# Patient Record
Sex: MALE | Race: OTHER | Employment: FULL TIME | URBAN - NONMETROPOLITAN AREA
[De-identification: names, ages, dates, MRNs, and addresses within clinical notes are randomized per-mention and may not be internally consistent; named-entity substitution may affect disease eponyms.]

---
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
## 2018-12-18 ENCOUNTER — HOSPITAL ENCOUNTER (EMERGENCY)
Age: 18
Discharge: HOME | End: 2018-12-18
Payer: MEDICARE

## 2018-12-18 VITALS
HEIGHT: 68 IN | RESPIRATION RATE: 16 BRPM | DIASTOLIC BLOOD PRESSURE: 81 MMHG | SYSTOLIC BLOOD PRESSURE: 133 MMHG | BODY MASS INDEX: 30.31 KG/M2 | OXYGEN SATURATION: 99 % | WEIGHT: 200 LBS | HEART RATE: 89 BPM | TEMPERATURE: 98.3 F

## 2018-12-18 DIAGNOSIS — S61.411A LACERATION OF RIGHT HAND WITHOUT FOREIGN BODY, INITIAL ENCOUNTER: Primary | ICD-10-CM

## 2018-12-18 PROCEDURE — 99282 EMERGENCY DEPT VISIT SF MDM: CPT

## 2018-12-18 PROCEDURE — 90471 IMMUNIZATION ADMIN: CPT | Performed by: STUDENT IN AN ORGANIZED HEALTH CARE EDUCATION/TRAINING PROGRAM

## 2018-12-18 PROCEDURE — 90715 TDAP VACCINE 7 YRS/> IM: CPT | Performed by: STUDENT IN AN ORGANIZED HEALTH CARE EDUCATION/TRAINING PROGRAM

## 2018-12-18 PROCEDURE — 6370000000 HC RX 637 (ALT 250 FOR IP)

## 2018-12-18 PROCEDURE — 6360000002 HC RX W HCPCS: Performed by: STUDENT IN AN ORGANIZED HEALTH CARE EDUCATION/TRAINING PROGRAM

## 2018-12-18 PROCEDURE — 12002 RPR S/N/AX/GEN/TRNK2.6-7.5CM: CPT

## 2018-12-18 RX ORDER — BACITRACIN, NEOMYCIN, POLYMYXIN B 400; 3.5; 5 [USP'U]/G; MG/G; [USP'U]/G
OINTMENT TOPICAL
Status: COMPLETED
Start: 2018-12-18 | End: 2018-12-18

## 2018-12-18 RX ORDER — CEPHALEXIN 500 MG/1
500 CAPSULE ORAL 4 TIMES DAILY
Qty: 28 CAPSULE | Refills: 0 | Status: SHIPPED | OUTPATIENT
Start: 2018-12-18 | End: 2018-12-25

## 2018-12-18 RX ADMIN — BACITRACIN, NEOMYCIN, POLYMYXIN B 1 G: 400; 3.5; 5 OINTMENT TOPICAL at 14:16

## 2018-12-18 RX ADMIN — TETANUS TOXOID, REDUCED DIPHTHERIA TOXOID AND ACELLULAR PERTUSSIS VACCINE, ADSORBED 0.5 ML: 5; 2.5; 8; 8; 2.5 SUSPENSION INTRAMUSCULAR at 14:16

## 2018-12-18 ASSESSMENT — ENCOUNTER SYMPTOMS
ABDOMINAL PAIN: 0
EYE DISCHARGE: 0
VOMITING: 0
EYE REDNESS: 0
SORE THROAT: 0
COUGH: 0
WHEEZING: 0
RHINORRHEA: 0
SHORTNESS OF BREATH: 0
BACK PAIN: 0
NAUSEA: 0
DIARRHEA: 0

## 2018-12-18 ASSESSMENT — PAIN DESCRIPTION - LOCATION: LOCATION: HAND

## 2018-12-18 ASSESSMENT — PAIN DESCRIPTION - DESCRIPTORS: DESCRIPTORS: BURNING

## 2018-12-18 ASSESSMENT — PAIN SCALES - GENERAL: PAINLEVEL_OUTOF10: 5

## 2018-12-18 ASSESSMENT — PAIN DESCRIPTION - PAIN TYPE: TYPE: ACUTE PAIN

## 2018-12-18 NOTE — ED PROVIDER NOTES
Asthma. SURGICAL HISTORY      has no past surgical history on file. CURRENT MEDICATIONS       Previous Medications    ACETAMINOPHEN (APAP EXTRA STRENGTH) 500 MG TABLET    Take 1 tablet by mouth every 6 hours as needed for Pain    AZELASTINE (ASTELIN) 0.1 % NASAL SPRAY    1 spray by Nasal route 2 times daily for 14 days Use in each nostril as directed    IBUPROFEN (ADVIL;MOTRIN) 600 MG TABLET    Take 1 tablet by mouth every 6 hours as needed for Pain    IBUPROFEN (IBU) 600 MG TABLET    Take 1 tablet by mouth every 8 hours as needed for Pain or Fever. ONDANSETRON (ZOFRAN ODT) 4 MG DISINTEGRATING TABLET    Take 1 tablet by mouth every 8 hours as needed for Nausea       ALLERGIES     is allergic to other. FAMILY HISTORY     indicated that his mother is alive. He indicated that his father is alive. He indicated that the status of his sister is unknown.    family history includes Diabetes in his father and sister; High Blood Pressure in his father; No Known Problems in his mother. SOCIAL HISTORY      reports that he has never smoked. He does not have any smokeless tobacco history on file. He reports that he uses drugs, including Marijuana. He reports that he does not drink alcohol. PHYSICAL EXAM   INITIAL VITALS:  height is 5' 8\" (1.727 m) and weight is 200 lb (90.7 kg). His oral temperature is 98.3 °F (36.8 °C). His blood pressure is 133/81 and his pulse is 89. His respiration is 16 and oxygen saturation is 99%. Physical Exam   Constitutional: He is oriented to person, place, and time. He appears well-developed and well-nourished. HENT:   Head: Normocephalic and atraumatic. Right Ear: External ear normal.   Left Ear: External ear normal.   Eyes: Conjunctivae are normal. Right eye exhibits no discharge. Left eye exhibits no discharge. No scleral icterus. Neck: Normal range of motion. Neck supple. No JVD present. Pulmonary/Chest: Effort normal. No stridor. No respiratory distress.    Abdominal: Soft. He exhibits no distension. Musculoskeletal: Normal range of motion. He exhibits no edema. Right wrist: Normal. He exhibits normal range of motion. Right hand: He exhibits laceration (3 cm laceration that is 4 mm deep into the subcutaneous to the right tyler surface over the base of the thumb at the bony prawns of the scaphoid bone with no scaphoid involvement). He exhibits normal range of motion, no tenderness, normal two-point discrimination, normal capillary refill, no deformity and no swelling. Normal sensation noted. Normal strength noted. Neurological: He is alert and oriented to person, place, and time. He exhibits normal muscle tone. GCS eye subscore is 4. GCS verbal subscore is 5. GCS motor subscore is 6. Skin: Skin is warm and dry. He is not diaphoretic. No erythema. Psychiatric: He has a normal mood and affect. His behavior is normal.   Nursing note and vitals reviewed. DIFFERENTIAL DIAGNOSIS:   Including but not limited to: laceration, infection, foreign body     DIAGNOSTIC RESULTS     EKG: All EKG's are interpreted by the Emergency Department Physician who either signs or Co-signs this chart in the absence of a cardiologist.    None    RADIOLOGY: non-plainfilm images(s) such as CT, Ultrasound and MRI are read by the radiologist.    No orders to display       LABS:     Labs Reviewed - No data to display    EMERGENCY DEPARTMENT COURSE:   Vitals:    Vitals:    12/18/18 1332   BP: 133/81   Pulse: 89   Resp: 16   Temp: 98.3 °F (36.8 °C)   TempSrc: Oral   SpO2: 99%   Weight: 200 lb (90.7 kg)   Height: 5' 8\" (1.727 m)       Patient was seen history physical exam was performed. See disposition below    MDM:  The patient was seen and evaluated by me at bedside for laceration to right hand. The patient arrived in no acute distress and in stable condition. Within the department, I observed the patient's vital signs to be within acceptable range.  On exam, I appreciated 3 cm

## 2018-12-18 NOTE — ED NOTES
Pt states he was at home getting ready for work and messing around with his car and went to cut something and missed and sliced his right hand.       Tigist Rudolph RN  12/18/18 8771

## 2020-06-17 ENCOUNTER — HOSPITAL ENCOUNTER (EMERGENCY)
Age: 20
Discharge: HOME | End: 2020-06-17
Attending: EMERGENCY MEDICINE
Payer: MEDICARE

## 2020-06-17 VITALS
WEIGHT: 220 LBS | HEART RATE: 57 BPM | RESPIRATION RATE: 16 BRPM | BODY MASS INDEX: 33.45 KG/M2 | TEMPERATURE: 97 F | OXYGEN SATURATION: 97 % | DIASTOLIC BLOOD PRESSURE: 70 MMHG | SYSTOLIC BLOOD PRESSURE: 130 MMHG

## 2020-06-17 PROCEDURE — 99212 OFFICE O/P EST SF 10 MIN: CPT

## 2020-06-17 PROCEDURE — 6370000000 HC RX 637 (ALT 250 FOR IP): Performed by: EMERGENCY MEDICINE

## 2020-06-17 PROCEDURE — 99203 OFFICE O/P NEW LOW 30 MIN: CPT | Performed by: EMERGENCY MEDICINE

## 2020-06-17 RX ORDER — ONDANSETRON 4 MG/1
4 TABLET, ORALLY DISINTEGRATING ORAL 4 TIMES DAILY PRN
Qty: 10 TABLET | Refills: 0 | Status: SHIPPED | OUTPATIENT
Start: 2020-06-17 | End: 2020-12-04

## 2020-06-17 RX ORDER — ONDANSETRON 4 MG/1
8 TABLET, ORALLY DISINTEGRATING ORAL ONCE
Status: COMPLETED | OUTPATIENT
Start: 2020-06-17 | End: 2020-06-17

## 2020-06-17 RX ADMIN — ONDANSETRON 8 MG: 4 TABLET, ORALLY DISINTEGRATING ORAL at 14:23

## 2020-06-17 ASSESSMENT — ENCOUNTER SYMPTOMS
VOMITING: 1
EYE REDNESS: 0
EYE DISCHARGE: 0
DIARRHEA: 0
BACK PAIN: 0
SHORTNESS OF BREATH: 0
SINUS PRESSURE: 0
ABDOMINAL PAIN: 1
NAUSEA: 1
TROUBLE SWALLOWING: 0
STRIDOR: 0
SORE THROAT: 0
EYE PAIN: 0
WHEEZING: 0
VOICE CHANGE: 0
COUGH: 0

## 2020-06-17 NOTE — ED PROVIDER NOTES
[address]       Chief Complaint   Patient presents with    Emesis     Pt has been vomiting this morning. Nurses Notes reviewed and I agree except as noted in the HPI. HISTORY OF PRESENT ILLNESS   Georgiana Koch is a 22 y.o. male who presents with 6 hours of vomiting nonbloody, associated cramping abdominal pain. Vomiting and pain has resolved with the patient now nauseated and has fatigue. Thought he ate some tainted food prior to the onset of symptoms. No fever, diarrhea, bloody stools, chest pain, shortness of breath, dizziness, syncope, rash,  symptoms. No history of diabetes. Non-smoker. REVIEW OF SYSTEMS     Review of Systems   Constitutional: Positive for appetite change and fatigue. Negative for chills, fever and unexpected weight change. HENT: Negative for congestion, ear discharge, ear pain, sinus pressure, sneezing, sore throat, trouble swallowing and voice change. Eyes: Negative for pain, discharge and redness. Respiratory: Negative for cough, shortness of breath, wheezing and stridor. Cardiovascular: Negative for chest pain and leg swelling. Gastrointestinal: Positive for abdominal pain, nausea and vomiting. Negative for diarrhea. Genitourinary: Negative for dysuria, frequency, hematuria and urgency. Musculoskeletal: Negative for arthralgias, back pain, myalgias and neck pain. Skin: Negative for rash. Neurological: Negative for dizziness, syncope, weakness and headaches. Hematological: Negative for adenopathy. Psychiatric/Behavioral: Negative for behavioral problems, confusion, sleep disturbance and suicidal ideas. The patient is not nervous/anxious. All other systems reviewed and are negative. PAST MEDICAL HISTORY         Diagnosis Date    Asthma        SURGICAL HISTORY     Patient  has no past surgical history on file.     CURRENT MEDICATIONS       Discharge Medication List as of 6/17/2020  2:55 PM      CONTINUE these medications which have NOT CHANGED    Details   acetaminophen (APAP EXTRA STRENGTH) 500 MG tablet Take 1 tablet by mouth every 6 hours as needed for Pain, Disp-120 tablet, R-0Print      !! ibuprofen (ADVIL;MOTRIN) 600 MG tablet Take 1 tablet by mouth every 6 hours as needed for Pain, Disp-30 tablet, R-0Print      azelastine (ASTELIN) 0.1 % nasal spray 1 spray by Nasal route 2 times daily for 14 days Use in each nostril as directed, Disp-1 Bottle, R-0      !! ibuprofen (IBU) 600 MG tablet Take 1 tablet by mouth every 8 hours as needed for Pain or Fever., Disp-42 tablet, R-0       !! - Potential duplicate medications found. Please discuss with provider. ALLERGIES     Patient is is allergic to other. FAMILY HISTORY     Patient'sfamily history includes Diabetes in his father and sister; High Blood Pressure in his father; No Known Problems in his mother. SOCIAL HISTORY     Patient  reports that he has never smoked. He has never used smokeless tobacco. He reports current drug use. Drug: Marijuana. He reports that he does not drink alcohol. PHYSICAL EXAM     ED TRIAGE VITALS  BP: 130/70, Temp: 97 °F (36.1 °C), Pulse: 57, Resp: 16, SpO2: 97 %  Physical Exam  Vitals signs and nursing note reviewed. Constitutional:       General: He is not in acute distress. Appearance: He is well-developed. He is not diaphoretic. Comments: Moist membranes, normal airway, nonicteric   HENT:      Head: Normocephalic and atraumatic. Right Ear: Tympanic membrane and external ear normal.      Left Ear: Tympanic membrane and external ear normal.      Nose: Nose normal.      Mouth/Throat:      Pharynx: No oropharyngeal exudate. Comments: Oropharynx normal  Eyes:      General: No scleral icterus. Right eye: No discharge. Left eye: No discharge. Extraocular Movements:      Right eye: Normal extraocular motion. Left eye: Normal extraocular motion. Judgment normal.         DIAGNOSTIC RESULTS   Labs: No results found for this visit on 06/17/20. IMAGING:  No orders to display     URGENT CARE COURSE:     Vitals:    06/17/20 1409   BP: 130/70   Pulse: 57   Resp: 16   Temp: 97 °F (36.1 °C)   TempSrc: Temporal   SpO2: 97%   Weight: 220 lb (99.8 kg)       Medications   ondansetron (ZOFRAN-ODT) disintegrating tablet 8 mg (8 mg Oral Given 6/17/20 1423)   Tolerated well, improved               PROCEDURES:  None  FINALIMPRESSION      1. Acute infective gastroenteritis        DISPOSITION/PLAN   DISPOSITION Decision To Discharge 06/17/2020 02:37:54 PM  Nontoxic, well-hydrated, normal airway. No sepsis or CNS infection. Abdomen nonsurgical.  Patient improved. Should do well with oral rehydration and antiemetics. Will treat with Zofran, Tylenol, clear liquid diet, rest.  Patient to recheck with PCP in 2 days if problems persist, and he understands to go to ED if worse.   PATIENT REFERRED TO:  CHRISTOPHER Price - CNP  [facility] [facility] [place] [address]  [phone]    Schedule an appointment as soon as possible for a visit in 2 days  Recheck if problems persist, go to emergency if worse    DISCHARGE MEDICATIONS:  Discharge Medication List as of 6/17/2020  2:55 PM        Discharge Medication List as of 6/17/2020  2:55 PM      CONTINUE these medications which have CHANGED    Details   ondansetron (ZOFRAN ODT) 4 MG disintegrating tablet Take 1 tablet by mouth 4 times daily as needed for Nausea or Vomiting (Dissolve on tongue 4 times daily for nausea vomiting), Disp-10 tablet, R-0Print             MD Tyron Powers MD  06/17/20 7797

## 2020-09-10 ENCOUNTER — HOSPITAL ENCOUNTER (EMERGENCY)
Age: 20
Discharge: HOME | End: 2020-09-10
Payer: MEDICARE

## 2020-09-10 VITALS
OXYGEN SATURATION: 98 % | TEMPERATURE: 98.8 F | HEIGHT: 67 IN | SYSTOLIC BLOOD PRESSURE: 145 MMHG | WEIGHT: 219.6 LBS | BODY MASS INDEX: 34.47 KG/M2 | HEART RATE: 86 BPM | RESPIRATION RATE: 16 BRPM | DIASTOLIC BLOOD PRESSURE: 80 MMHG

## 2020-09-10 PROCEDURE — 99213 OFFICE O/P EST LOW 20 MIN: CPT | Performed by: NURSE PRACTITIONER

## 2020-09-10 PROCEDURE — 99213 OFFICE O/P EST LOW 20 MIN: CPT

## 2020-09-10 PROCEDURE — 6370000000 HC RX 637 (ALT 250 FOR IP): Performed by: NURSE PRACTITIONER

## 2020-09-10 RX ORDER — ONDANSETRON 4 MG/1
4 TABLET, ORALLY DISINTEGRATING ORAL ONCE
Status: COMPLETED | OUTPATIENT
Start: 2020-09-10 | End: 2020-09-10

## 2020-09-10 RX ADMIN — ONDANSETRON 4 MG: 4 TABLET, ORALLY DISINTEGRATING ORAL at 14:04

## 2020-09-10 ASSESSMENT — ENCOUNTER SYMPTOMS
COUGH: 0
RHINORRHEA: 0
NAUSEA: 1
ABDOMINAL PAIN: 0
DIARRHEA: 0
TROUBLE SWALLOWING: 0
SORE THROAT: 0

## 2020-09-10 NOTE — ED PROVIDER NOTES
[place] [id number]  Urgent Care Encounter       CHIEF COMPLAINT       Chief Complaint   Patient presents with    Emesis     X2 TODAY AT WORK    Headache     BODYACHES       Nurses Notes reviewed and I agree except as noted in the HPI. HISTORY OF PRESENT ILLNESS   Adele Lisa is a 22 y.o. male who presents to the urgent care center complaining of going to work today and apparently vomited twice at work and stated it was stomach contents he complained of a dull headache and generalized body aches. Patient states he felt okay yesterday slept through the night okay got up this morning felt okay went to work where he felt hot and vomited. The patient is not taking any medication since he vomited and states that it was approximately 5 to 6  hours prior to arrival.   Patient/patient representative denies any foreign or domestic travel in the last 30 days. Patient /patient representative denies exposure to those with similar symptoms. Patient/patient representative denies contact with someone who was confirmed or suspected to have Coronavirus / COVID-19 within the last month. At the present time patient is sitting at the bedside skin is warm and dry patient does not appear to be in any acute distress and denies any complaints. Patient stated he would need a return to work slip for tomorrow. The history is provided by the patient. No  was used.    Nausea & Vomiting   Severity:  Mild  Duration:  6 hours  Timing:  Rare  Number of daily episodes:  X 2 at work today  Quality:  Stomach contents  Able to tolerate:  Liquids  Progression:  Partially resolved  Chronicity:  New  Recent urination:  Increased  Relieved by:  None tried  Worsened by:  Nothing  Ineffective treatments:  None tried  Associated symptoms: headaches    Associated symptoms: no abdominal pain, no arthralgias, no chills, no cough, no diarrhea, no fever, no myalgias, no sore throat and no URI    Headaches: Severity:  Mild    Onset quality:  Sudden    Duration:  1 day    Timing:  Rare    Progression:  Resolved    Chronicity:  New  Risk factors: no prior abdominal surgery, no sick contacts and no suspect food intake        REVIEW OF SYSTEMS     Review of Systems   Constitutional: Negative for activity change, appetite change, chills, diaphoresis, fatigue and fever. HENT: Negative for congestion, rhinorrhea, sore throat and trouble swallowing. Respiratory: Negative for cough. Gastrointestinal: Positive for nausea. Negative for abdominal pain and diarrhea. Genitourinary: Negative for decreased urine volume and difficulty urinating. Musculoskeletal: Negative for arthralgias and myalgias. Skin: Negative for rash. Allergic/Immunologic: Negative for environmental allergies. Neurological: Positive for headaches. Negative for light-headedness. Hematological: Negative for adenopathy. PAST MEDICAL HISTORY         Diagnosis Date    Asthma        SURGICALHISTORY     Patient  has no past surgical history on file.     CURRENT MEDICATIONS       Discharge Medication List as of 9/10/2020  2:00 PM      CONTINUE these medications which have NOT CHANGED    Details   !! ibuprofen (ADVIL;MOTRIN) 600 MG tablet Take 1 tablet by mouth every 6 hours as needed for Pain, Disp-30 tablet, R-0Print      ondansetron (ZOFRAN ODT) 4 MG disintegrating tablet Take 1 tablet by mouth 4 times daily as needed for Nausea or Vomiting (Dissolve on tongue 4 times daily for nausea vomiting), Disp-10 tablet, R-0Print      acetaminophen (APAP EXTRA STRENGTH) 500 MG tablet Take 1 tablet by mouth every 6 hours as needed for Pain, Disp-120 tablet, R-0Print      azelastine (ASTELIN) 0.1 % nasal spray 1 spray by Nasal route 2 times daily for 14 days Use in each nostril as directed, Disp-1 Bottle, R-0      !! ibuprofen (IBU) 600 MG tablet Take 1 tablet by mouth every 8 hours as needed for Pain or Fever., Disp-42 tablet, R-0       !! - Potential duplicate medications found. Please discuss with provider. ALLERGIES     Patient is is allergic to other. Patients   Immunization History   Administered Date(s) Administered    Tdap (Boostrix, Adacel) 12/18/2018       FAMILY HISTORY     Patient's family history includes Diabetes in his father and sister; High Blood Pressure in his father; No Known Problems in his mother. SOCIAL HISTORY     Patient  reports that he has never smoked. He has never used smokeless tobacco. He reports current drug use. Drug: Marijuana. He reports that he does not drink alcohol. PHYSICAL EXAM     ED TRIAGE VITALS  BP: (!) 145/80, Temp: 98.8 °F (37.1 °C), Pulse: 86, Resp: 16, SpO2: 98 %,Estimated body mass index is 34.39 kg/m² as calculated from the following:    Height as of this encounter: 5' 7\" (1.702 m). Weight as of this encounter: 219 lb 9.6 oz (99.6 kg). ,No LMP for male patient. Physical Exam  Vitals signs and nursing note reviewed. Constitutional:       General: He is not in acute distress. Appearance: He is well-developed. He is not ill-appearing, toxic-appearing or diaphoretic. HENT:      Head: Normocephalic. Right Ear: Hearing, tympanic membrane, ear canal and external ear normal. Tympanic membrane is not erythematous. Left Ear: Hearing, tympanic membrane, ear canal and external ear normal. Tympanic membrane is not erythematous. Nose: No congestion or rhinorrhea. Right Turbinates: Not enlarged or swollen. Left Turbinates: Not enlarged or swollen. Mouth/Throat:      Lips: Pink. Mouth: Mucous membranes are moist.      Tongue: No lesions. Pharynx: Oropharynx is clear. Uvula midline. No pharyngeal swelling, oropharyngeal exudate, posterior oropharyngeal erythema or uvula swelling. Tonsils: No tonsillar exudate or tonsillar abscesses. Eyes:      Conjunctiva/sclera: Conjunctivae normal.      Pupils: Pupils are equal, round, and reactive to light.    Neck: PROCEDURES:  None    FINAL IMPRESSION      1. Intractable vomiting with nausea, unspecified vomiting type          DISPOSITION/ PLAN      I recommend Clear Liquids only next 12-24 hours. If tolerated then BRAT Diet . Advise the patient that if worsening symptoms such as more than 6 stools per day, not voiding regularly, persistent vomiting not relieved with medication,unable to take oral fluids, high fever, severe weakness, lightheadedness or fainting, dry mucous membranes or other signs of dehydration, persisting or increasing abdominal pain, blood in stool or vomit, or failure to improve in 1-2 days. The patient needs to be reevaluated at that time by their primary care provider for a recheck, OR go the Emergency Department for reevaluation. The patient or patient's representative are agreeable to the treatment plan and left ambulatory without any complaints at this time. PATIENT REFERRED TO:  Faustino Fernandez.  Mago Vanegas [address] [place] [id number]      DISCHARGE MEDICATIONS:  Discharge Medication List as of 9/10/2020  2:00 PM          Discharge Medication List as of 9/10/2020  2:00 PM          Discharge Medication List as of 9/10/2020  2:00 PM          CHRISTOPHER Allen CNP    (Please note that portions of this note were completed with a voice recognition program. Efforts were made to edit the dictations but occasionally words are mis-transcribed.)           CHRISTOPHER Allen CNP  09/10/20 [id number]

## 2020-09-10 NOTE — LETTER
[facility]  [address]  [address]  Phone: [phone]               September 10, 2020    Patient: Kerri Ruelas   YOB: 2000   Date of Visit: 9/10/2020       To Whom It May Concern:    Cydney Rincon was seen and treated in our emergency department on 9/10/2020. He may return to work on 9/11/2020.       Sincerely,       CHRISTOPHER Alvarez CNP         Signature:__Electronically signed by CHRISTOPHER Alvarez CNP on 9/10/2020 at 2:00 PM  ________________________________

## 2020-12-04 ENCOUNTER — HOSPITAL ENCOUNTER (EMERGENCY)
Age: 20
Discharge: HOME | End: 2020-12-04
Payer: MEDICARE

## 2020-12-04 VITALS
RESPIRATION RATE: 16 BRPM | SYSTOLIC BLOOD PRESSURE: 143 MMHG | TEMPERATURE: 98.3 F | OXYGEN SATURATION: 98 % | DIASTOLIC BLOOD PRESSURE: 83 MMHG | HEART RATE: 62 BPM

## 2020-12-04 LAB
EKG ATRIAL RATE: 62 BPM
EKG P AXIS: 25 DEGREES
EKG P-R INTERVAL: 132 MS
EKG Q-T INTERVAL: 392 MS
EKG QRS DURATION: 110 MS
EKG QTC CALCULATION (BAZETT): 397 MS
EKG R AXIS: 32 DEGREES
EKG T AXIS: 11 DEGREES
EKG VENTRICULAR RATE: 62 BPM

## 2020-12-04 PROCEDURE — 93005 ELECTROCARDIOGRAM TRACING: CPT | Performed by: NURSE PRACTITIONER

## 2020-12-04 PROCEDURE — 93010 ELECTROCARDIOGRAM REPORT: CPT | Performed by: INTERNAL MEDICINE

## 2020-12-04 PROCEDURE — 99215 OFFICE O/P EST HI 40 MIN: CPT

## 2020-12-04 ASSESSMENT — ENCOUNTER SYMPTOMS
SHORTNESS OF BREATH: 0
SINUS PAIN: 0
SORE THROAT: 0
CHEST TIGHTNESS: 0
NAUSEA: 0
VOMITING: 0
SINUS PRESSURE: 0
DIARRHEA: 0
WHEEZING: 0
ABDOMINAL PAIN: 0
COUGH: 0

## 2020-12-04 NOTE — ED PROVIDER NOTES
[facility]  Urgent Care Encounter       CHIEF COMPLAINT       Chief Complaint   Patient presents with    Shortness of Breath    Congestion       Nurses Notes reviewed and I agree except as noted in the HPI. HISTORY OF PRESENT ILLNESS   Vijay Montero is a 23 y.o. male who presents     Patient is present in the urgent care today with complaints of shortness of breath and chest congestion that has been off and on for the last 3 days, has currently resolved. He states that when he arrived to work today, he was walking around, when chest pain had started again. He states that it feels as though it is to the center chest, and does not particularly radiate anywhere. He does have a history of asthma, but states that he has not been on any prescription medications for management for the last several years, and has not been following up with any primary care provider. Denies any history of smoking. Denies any cough with shortness of breath. REVIEW OF SYSTEMS     Review of Systems   Constitutional: Negative for chills, fatigue and fever. HENT: Negative for congestion, postnasal drip, sinus pressure, sinus pain and sore throat. Respiratory: Negative for cough, chest tightness, shortness of breath and wheezing. Cardiovascular: Negative for chest pain. Gastrointestinal: Negative for abdominal pain, diarrhea, nausea and vomiting. Musculoskeletal: Negative for myalgias. Skin: Negative for rash. Neurological: Negative for dizziness, weakness, light-headedness, numbness and headaches. PAST MEDICAL HISTORY         Diagnosis Date    Asthma        SURGICALHISTORY     Patient  has no past surgical history on file. CURRENT MEDICATIONS       Discharge Medication List as of 12/4/2020 11:00 AM          ALLERGIES     Patient is is allergic to other.     Patients   Immunization History   Administered Date(s) Administered    Tdap (Boostrix, Adacel) 12/18/2018       FAMILY HISTORY     Patient's family history includes Diabetes in his father and sister; High Blood Pressure in his father; No Known Problems in his mother. SOCIAL HISTORY     Patient  reports that he has never smoked. He has never used smokeless tobacco. He reports current drug use. Drug: Marijuana. He reports that he does not drink alcohol. PHYSICAL EXAM     ED TRIAGE VITALS  BP: (!) 143/83, Temp: 98.3 °F (36.8 °C), Pulse: 62, Resp: 16, SpO2: 98 %,Estimated body mass index is 34.39 kg/m² as calculated from the following:    Height as of 9/10/20: 5' 7\" (1.702 m). Weight as of 9/10/20: 219 lb 9.6 oz (99.6 kg). ,No LMP for male patient. Physical Exam  Constitutional:       General: He is not in acute distress. Appearance: He is well-developed. He is not ill-appearing, toxic-appearing or diaphoretic. Interventions: He is not intubated. Cardiovascular:      Rate and Rhythm: Normal rate. No extrasystoles are present. Pulses: No decreased pulses. Heart sounds: No murmur. No friction rub. No gallop. Pulmonary:      Effort: Pulmonary effort is normal. No tachypnea, bradypnea, accessory muscle usage or respiratory distress. He is not intubated. Breath sounds: No stridor. No decreased breath sounds, wheezing, rhonchi or rales. Chest:      Chest wall: No tenderness or edema. Musculoskeletal: Normal range of motion. Skin:     General: Skin is warm. Capillary Refill: Capillary refill takes less than 2 seconds. Neurological:      General: No focal deficit present. Mental Status: He is alert and oriented to person, place, and time. Cranial Nerves: No cranial nerve deficit. Motor: No weakness. Psychiatric:         Mood and Affect: Mood normal. Mood is not anxious. Behavior: Behavior normal. Behavior is not agitated.          DIAGNOSTIC RESULTS     Labs:  Results for orders placed or performed during the hospital encounter of 12/04/20   EKG 12 Lead   Result Value Ref Range    Ventricular Rate 62 BPM    Atrial Rate 62 BPM    P-R Interval 132 ms    QRS Duration 110 ms    Q-T Interval 392 ms    QTc Calculation (Bazett) 397 ms    P Axis 25 degrees    R Axis 32 degrees    T Axis 11 degrees       IMAGING:    No orders to display         EKG: NSR 62      URGENT CARE COURSE:     Vitals:    12/04/20 1036   BP: (!) 143/83   Pulse: 62   Resp: 16   Temp: 98.3 °F (36.8 °C)   TempSrc: Temporal   SpO2: 98%       Medications - No data to display         PROCEDURES:  None    FINAL IMPRESSION      1. Anxiety attack          DISPOSITION/ PLAN   Patient is discharged home with instructions to establish with a primary care provider for further evaluation of suspected anxiety with possible panic attack. Discussed with patient that his EKG does appear to be in normal sinus rhythm, he is not currently in any distress, and symptoms have resolved at present. Discussed with patient that if they return and are persistent, he should call 911 and go directly to the ER, for further evaluation and cardiac rule out. PATIENT REFERRED TO:  Nikki Campos.  CHRISTOPHER Vanegas - SANDRA  [address] [facility]  [facility] / [facility] [address]      DISCHARGE MEDICATIONS:  Discharge Medication List as of 12/4/2020 11:00 AM          Discharge Medication List as of 12/4/2020 11:00 AM      STOP taking these medications       ondansetron (ZOFRAN ODT) 4 MG disintegrating tablet Comments:   Reason for Stopping:         acetaminophen (APAP EXTRA STRENGTH) 500 MG tablet Comments:   Reason for Stopping:         ibuprofen (ADVIL;MOTRIN) 600 MG tablet Comments:   Reason for Stopping:         azelastine (ASTELIN) 0.1 % nasal spray Comments:   Reason for Stopping:         ibuprofen (IBU) 600 MG tablet Comments:   Reason for Stopping:               Discharge Medication List as of 12/4/2020 11:00 AM          CHRISTOPHER Esparza NP    (Please note that portions of this note were completed with a voice recognition program. Efforts were made to edit the dictations but occasionally words are mis-transcribed.)         Urbano Costa, CHRISTOPHER - NP  12/04/20 6402

## 2020-12-04 NOTE — ED NOTES
Patient understood instructions verbally,  Follow up with PCP with any concerns,  ambulated self to lobby,stable condition.       Nick Clement LPN  22/69/99 8178

## 2020-12-10 ENCOUNTER — HOSPITAL ENCOUNTER (EMERGENCY)
Age: 20
Discharge: HOME | End: 2020-12-10
Payer: MEDICARE

## 2020-12-10 VITALS
BODY MASS INDEX: 34.46 KG/M2 | TEMPERATURE: 97.7 F | WEIGHT: 220 LBS | OXYGEN SATURATION: 98 % | RESPIRATION RATE: 16 BRPM | HEART RATE: 64 BPM | SYSTOLIC BLOOD PRESSURE: 129 MMHG | DIASTOLIC BLOOD PRESSURE: 66 MMHG

## 2020-12-10 PROCEDURE — 99214 OFFICE O/P EST MOD 30 MIN: CPT

## 2020-12-10 PROCEDURE — U0003 INFECTIOUS AGENT DETECTION BY NUCLEIC ACID (DNA OR RNA); SEVERE ACUTE RESPIRATORY SYNDROME CORONAVIRUS 2 (SARS-COV-2) (CORONAVIRUS DISEASE [COVID-19]), AMPLIFIED PROBE TECHNIQUE, MAKING USE OF HIGH THROUGHPUT TECHNOLOGIES AS DESCRIBED BY CMS-2020-01-R: HCPCS

## 2020-12-10 PROCEDURE — 99213 OFFICE O/P EST LOW 20 MIN: CPT | Performed by: NURSE PRACTITIONER

## 2020-12-10 ASSESSMENT — ENCOUNTER SYMPTOMS
RHINORRHEA: 1
SORE THROAT: 0
SWOLLEN GLANDS: 0
ABDOMINAL PAIN: 0
NAUSEA: 0
VOMITING: 0
DIARRHEA: 1
SINUS PRESSURE: 0
BACK PAIN: 0
PHOTOPHOBIA: 0
SHORTNESS OF BREATH: 0
TROUBLE SWALLOWING: 0
COUGH: 1

## 2020-12-10 NOTE — ED NOTES
Throat swab for covid obtained, labeled taken to lab. Tolerated well. Nurse wearing PPE. Patient understood instructions verbally,  Follow up with PCP with any concerns, or worse symptoms follow up with ED. Work excuse with patient. ambulated self to lobby,stable condition.       Rosemarie Marlow LPN  95/83/62 2008

## 2020-12-10 NOTE — ED PROVIDER NOTES
[place] [address]  Urgent Care Encounter       CHIEF COMPLAINT       Chief Complaint   Patient presents with    Headache     Headache, runny nose and diarrhea. Nurses Notes reviewed and I agree except as noted in the HPI. HISTORY OF PRESENT ILLNESS   Eze Santamaria is a 23 y.o. male who presents the urgent care center complaining of nasal congestion cough and headache for the past week and stated this morning he had diarrhea 3 times since 7 AM.  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 The patient states he has a 3week-old child at home and wanted to be checked to make sure that he was going to get his baby sick. Patient denies any fever, chills nausea present time but states he did vomit on Tuesday once. At the present time patient sitting on table skin is warm and dry does not appear to be in any acute distress. The patient denies any pain states he did eat breakfast today and has not had any nausea or vomiting. The patient has not taken any medication for this particular illness other than Tylenol for his headache which did help. States that he does have a history of environmental allergies but takes no medicine for that. The history is provided by the patient.    Headache   Pain location:  Frontal  Quality:  Dull  Radiates to:  Does not radiate  Onset quality:  Sudden  Duration:  1 week  Timing:  Intermittent  Progression:  Waxing and waning  Chronicity:  New  Similar to prior headaches: yes    Relieved by:  Acetaminophen  Worsened by:  Nothing  Ineffective treatments:  None tried  Associated symptoms: congestion, cough and diarrhea    Associated symptoms: no abdominal pain, no back pain, no dizziness, no drainage, no ear pain, no fatigue, no fever, no loss of balance, no nausea, no neck stiffness, no photophobia, no sinus pressure, no sore throat, no swollen glands, no syncope and no vomiting    Congestion:     Location:  Chest Interferes with sleep: no      Interferes with eating/drinking: no    Cough:     Cough characteristics:  Productive    Sputum characteristics:  Yellow    Severity:  Mild    Onset quality:  Gradual    Duration:  1 week    Timing:  Constant    Progression:  Unchanged    Chronicity:  New  Diarrhea:     Quality:  Semi-solid (\"normal color\")    Number of occurrences:  X 3 today since 7am    Severity:  Mild    Duration:  2 hours    Timing:  Constant    Progression:  Unchanged  Risk factors: lifestyle not sedentary        REVIEW OF SYSTEMS     Review of Systems   Constitutional: Negative for activity change, appetite change, chills, fatigue and fever. HENT: Positive for congestion and rhinorrhea. Negative for ear pain, postnasal drip, sinus pressure, sore throat and trouble swallowing. Eyes: Negative for photophobia. Respiratory: Positive for cough. Negative for shortness of breath. Cardiovascular: Negative for chest pain and syncope. Gastrointestinal: Positive for diarrhea. Negative for abdominal pain, nausea and vomiting. Musculoskeletal: Negative for back pain and neck stiffness. Skin: Negative for rash. Allergic/Immunologic: Positive for environmental allergies. Neurological: Positive for headaches. Negative for dizziness, light-headedness and loss of balance. Hematological: Negative for adenopathy. PAST MEDICAL HISTORY         Diagnosis Date    Asthma        SURGICALHISTORY     Patient  has no past surgical history on file. CURRENT MEDICATIONS       There are no discharge medications for this patient. ALLERGIES     Patient is is allergic to other. Patients   Immunization History   Administered Date(s) Administered    Tdap (Boostrix, Adacel) 12/18/2018       FAMILY HISTORY     Patient's family history includes Diabetes in his father and sister; High Blood Pressure in his father; No Known Problems in his mother. SOCIAL HISTORY     Patient  reports that he has never smoked.  He has never used smokeless tobacco. He reports current drug use. Drug: Marijuana. He reports that he does not drink alcohol. PHYSICAL EXAM     ED TRIAGE VITALS  BP: 129/66, Temp: 97.7 °F (36.5 °C), Pulse: 64, Resp: 16, SpO2: 98 %,Estimated body mass index is 34.46 kg/m² as calculated from the following:    Height as of 9/10/20: 5' 7\" (1.702 m). Weight as of this encounter: 220 lb (99.8 kg). ,No LMP for male patient. Physical Exam  Vitals signs and nursing note reviewed. Constitutional:       General: He is not in acute distress. Appearance: He is well-developed. He is not ill-appearing, toxic-appearing or diaphoretic. HENT:      Head: Normocephalic. Right Ear: Hearing, tympanic membrane, ear canal and external ear normal. Tympanic membrane is not erythematous. Left Ear: Hearing, tympanic membrane, ear canal and external ear normal. Tympanic membrane is not erythematous. Nose: Congestion present. No rhinorrhea. Right Turbinates: Not enlarged or swollen. Left Turbinates: Not enlarged or swollen. Mouth/Throat:      Lips: Pink. Mouth: Mucous membranes are moist.      Tongue: No lesions. Pharynx: Oropharynx is clear. Uvula midline. No pharyngeal swelling, oropharyngeal exudate, posterior oropharyngeal erythema or uvula swelling. Tonsils: No tonsillar exudate or tonsillar abscesses. Eyes:      Conjunctiva/sclera: Conjunctivae normal.      Pupils: Pupils are equal, round, and reactive to light. Neck:      Musculoskeletal: Full passive range of motion without pain, normal range of motion and neck supple. No neck rigidity. Cardiovascular:      Rate and Rhythm: Normal rate and regular rhythm. Heart sounds: Normal heart sounds, S1 normal and S2 normal.   Pulmonary:      Effort: Pulmonary effort is normal. No accessory muscle usage. Breath sounds: Normal breath sounds. No decreased air movement. No decreased breath sounds, wheezing, rhonchi or rales. Abdominal:      General: Bowel sounds are normal.      Palpations: Abdomen is soft. There is no shifting dullness, fluid wave, hepatomegaly, splenomegaly or pulsatile mass. Tenderness: There is no abdominal tenderness. Negative signs include Walker's sign and McBurney's sign. Lymphadenopathy:      Head:      Right side of head: No submental, submandibular, tonsillar, preauricular, posterior auricular or occipital adenopathy. Left side of head: No submental, submandibular, tonsillar, preauricular, posterior auricular or occipital adenopathy. Cervical: No cervical adenopathy. Right cervical: No superficial, deep or posterior cervical adenopathy. Left cervical: No superficial, deep or posterior cervical adenopathy. Skin:     General: Skin is warm and dry. Capillary Refill: Capillary refill takes less than 2 seconds. Neurological:      General: No focal deficit present. Mental Status: He is alert and oriented to person, place, and time. Psychiatric:         Mood and Affect: Mood normal.         Speech: Speech normal.         Behavior: Behavior normal. Behavior is cooperative. DIAGNOSTIC RESULTS     Labs:No results found for this visit on 12/10/20. IMAGING:    No orders to display         EKG:      URGENT CARE COURSE:     Vitals:    12/10/20 0840   BP: 129/66   Pulse: 64   Resp: 16   Temp: 97.7 °F (36.5 °C)   TempSrc: Temporal   SpO2: 98%   Weight: 220 lb (99.8 kg)       Medications - No data to display         PROCEDURES:  None    FINAL IMPRESSION      1. Viral illness    2. Diarrhea, unspecified type    3. Exposure to COVID-19 virus          DISPOSITION/ PLAN        I did discuss with Patient/patient's representative physical findings, vital signs, clinical data obtained and feel at this time the patient can be discharged to outpatient status with conservative management.   I did discuss with patient at this time he did not meet or have risk factors for testing such as traveling out of the country over the past 14 days, known contacts with anybody with COVID-19 and due to the selective nature of testing at this time. The patient was told although the risk is low he needs to continue to monitor the patient was advised to drink plenty of fluids. They were also advised to isolate at home for up to 2 weeks and promote social distancing. They may take Tylenol for fever or body aches. Take prescribed medication as directed if prescribed. The patient may also take OTC cough and cold medication as needed. Pt is advised to go to ER if symptoms worsen, new symptoms develop, high fever >100, chest pain or heaviness, breathing difficulty, lethargy to Dial 911 or call [facility] COVID-19 hotline number [phone] or your local [address] [place] [address]. The patient or patient's representative is agreeable to the treatment plan they're advised to follow-up with her primary care provider in one week for reevaluation. PATIENT REFERRED TO:  Nicole Tripp. [facility] [address]      DISCHARGE MEDICATIONS:  There are no discharge medications for this patient. There are no discharge medications for this patient. There are no discharge medications for this patient.       CHRISTOPHER Horvath CNP    (Please note that portions of this note were completed with a voice recognition program. Efforts were made to edit the dictations but occasionally words are mis-transcribed.)           CHRISTOPHER Horvath CNP  12/10/20 2334

## 2020-12-10 NOTE — LETTER
NOTIFICATION RETURN TO WORK / SCHOOL    12/10/2020    Mr. Álvaro Mares  [address] [facility] [facility] [place] [address]      To Whom It May Concern:    Álvaro Mares was tested for COVID-19 on 12/10/2020    He may return to work in 3-4 days. I recommend:return without restrictions provided negative test results    If there are questions or concerns, please have the patient contact our office.         Sincerely,  Electronically signed by CHRISTOPHER Romero CNP on 12/10/2020 at 9:03 AM      CHRISTOPHER Romero CNP

## 2020-12-11 ENCOUNTER — CARE COORDINATION (OUTPATIENT)
Dept: CARE COORDINATION | Age: 20
End: 2020-12-11

## 2020-12-11 NOTE — CARE COORDINATION
questions related to their healthcare. Author provided contact information for future reference. Patient/family/caregiver given information for [employer] and agrees to enroll yes  Patient's preferred e-mail: [email or web address]  Patient's preferred phone number: [phone]  Based on Loop alert triggers, patient will be contacted by nurse care manager for worsening symptoms.

## 2020-12-11 NOTE — ACP (ADVANCE CARE PLANNING)
Advance Care Planning   Healthcare Decision Maker:      Click here to complete Healthcare Decision Makers including selection of the Healthcare Decision Maker Relationship (ie \"Primary\")  Today we documented Decision Maker(s) consistent with Legal Next of Kin hierarchy.

## 2020-12-12 LAB — SARS-COV-2: NOT DETECTED

## 2021-05-03 ENCOUNTER — HOSPITAL ENCOUNTER (EMERGENCY)
Age: 21
Discharge: HOME | End: 2021-05-03
Payer: COMMERCIAL

## 2021-05-03 VITALS
OXYGEN SATURATION: 96 % | BODY MASS INDEX: 33.34 KG/M2 | TEMPERATURE: 98.1 F | SYSTOLIC BLOOD PRESSURE: 130 MMHG | HEART RATE: 52 BPM | RESPIRATION RATE: 18 BRPM | HEIGHT: 68 IN | DIASTOLIC BLOOD PRESSURE: 78 MMHG | WEIGHT: 220 LBS

## 2021-05-03 DIAGNOSIS — R51.9 ACUTE NONINTRACTABLE HEADACHE, UNSPECIFIED HEADACHE TYPE: Primary | ICD-10-CM

## 2021-05-03 DIAGNOSIS — R03.0 ELEVATED BLOOD PRESSURE READING: ICD-10-CM

## 2021-05-03 PROCEDURE — 96372 THER/PROPH/DIAG INJ SC/IM: CPT

## 2021-05-03 PROCEDURE — 6360000002 HC RX W HCPCS: Performed by: NURSE PRACTITIONER

## 2021-05-03 PROCEDURE — 99213 OFFICE O/P EST LOW 20 MIN: CPT

## 2021-05-03 PROCEDURE — 6370000000 HC RX 637 (ALT 250 FOR IP): Performed by: NURSE PRACTITIONER

## 2021-05-03 RX ORDER — IBUPROFEN 200 MG
200 TABLET ORAL EVERY 6 HOURS PRN
COMMUNITY

## 2021-05-03 RX ORDER — ONDANSETRON 4 MG/1
4 TABLET, ORALLY DISINTEGRATING ORAL EVERY 8 HOURS PRN
Qty: 20 TABLET | Refills: 0 | Status: SHIPPED | OUTPATIENT
Start: 2021-05-03

## 2021-05-03 RX ORDER — KETOROLAC TROMETHAMINE 30 MG/ML
30 INJECTION, SOLUTION INTRAMUSCULAR; INTRAVENOUS ONCE
Status: COMPLETED | OUTPATIENT
Start: 2021-05-03 | End: 2021-05-03

## 2021-05-03 RX ORDER — ONDANSETRON 4 MG/1
4 TABLET, ORALLY DISINTEGRATING ORAL ONCE
Status: COMPLETED | OUTPATIENT
Start: 2021-05-03 | End: 2021-05-03

## 2021-05-03 RX ORDER — ACETAMINOPHEN 325 MG/1
650 TABLET ORAL EVERY 6 HOURS PRN
COMMUNITY

## 2021-05-03 RX ADMIN — KETOROLAC TROMETHAMINE 30 MG: 30 INJECTION, SOLUTION INTRAMUSCULAR; INTRAVENOUS at 13:16

## 2021-05-03 RX ADMIN — ONDANSETRON 4 MG: 4 TABLET, ORALLY DISINTEGRATING ORAL at 13:16

## 2021-05-03 ASSESSMENT — PAIN - FUNCTIONAL ASSESSMENT: PAIN_FUNCTIONAL_ASSESSMENT: 0-10

## 2021-05-03 ASSESSMENT — PAIN DESCRIPTION - LOCATION: LOCATION: HEAD

## 2021-05-03 ASSESSMENT — PAIN DESCRIPTION - DESCRIPTORS: DESCRIPTORS: THROBBING

## 2021-05-03 ASSESSMENT — ENCOUNTER SYMPTOMS
NAUSEA: 1
SORE THROAT: 0
COUGH: 0
DIARRHEA: 0
ABDOMINAL PAIN: 0
VOMITING: 1
SHORTNESS OF BREATH: 0

## 2021-05-03 ASSESSMENT — PAIN SCALES - GENERAL
PAINLEVEL_OUTOF10: 4
PAINLEVEL_OUTOF10: 2

## 2021-05-03 ASSESSMENT — PAIN DESCRIPTION - FREQUENCY: FREQUENCY: CONTINUOUS

## 2021-05-03 ASSESSMENT — PAIN DESCRIPTION - PAIN TYPE: TYPE: ACUTE PAIN

## 2021-05-03 NOTE — ED PROVIDER NOTES
[facility]  [facility]       CHIEF COMPLAINT       Chief Complaint   Patient presents with    Headache    Emesis       Nurses Notes reviewed and I agree except as noted in the HPI. HISTORY OF PRESENT ILLNESS   Sarah Esparza is a 23 y.o. male who presents for evaluation of a posterior headache with nausea and vomiting that been ongoing for the past week. Patient states that he is taking Tylenol and ibuprofen at home with minimal relief of the symptoms. He denies any dizziness, blurry vision, weakness in the extremities. He denies any recent illness or any other symptoms at this time. States that he had to miss work today due to the symptoms. The history is provided by the patient. REVIEW OF SYSTEMS     Review of Systems   Constitutional: Negative for chills and fever. HENT: Negative for congestion and sore throat. Respiratory: Negative for cough and shortness of breath. Cardiovascular: Negative for chest pain. Gastrointestinal: Positive for nausea and vomiting. Negative for abdominal pain and diarrhea. Musculoskeletal: Negative for arthralgias and myalgias. Skin: Negative for rash. Allergic/Immunologic: Negative for environmental allergies. Neurological: Positive for headaches. Negative for weakness. PAST MEDICAL HISTORY         Diagnosis Date    Asthma        SURGICALHISTORY     Patient  has no past surgical history on file. CURRENT MEDICATIONS       Previous Medications    ACETAMINOPHEN (TYLENOL) 325 MG TABLET    Take 650 mg by mouth every 6 hours as needed for Pain    IBUPROFEN (ADVIL;MOTRIN) 200 MG TABLET    Take 200 mg by mouth every 6 hours as needed for Pain       ALLERGIES     Patient is is allergic to other.     Patients   Immunization History   Administered Date(s) Administered    Tdap (Boostrix, Adacel) 12/18/2018       FAMILY HISTORY     Patient's family history includes Diabetes in his father and sister; High Blood Pressure in his father; No Known Problems in his mother. SOCIAL HISTORY     Patient  reports that he has never smoked. He has never used smokeless tobacco. He reports current alcohol use. He reports current drug use. Drug: Marijuana. PHYSICAL EXAM     ED TRIAGE VITALS  BP: (!) 148/77, Temp: 98.1 °F (36.7 °C), Pulse: 55, Resp: 18, SpO2: 96 %,Estimated body mass index is 33.45 kg/m² as calculated from the following:    Height as of this encounter: 5' 8\" (1.727 m). Weight as of this encounter: 220 lb (99.8 kg). ,No LMP for male patient. Physical Exam  Vitals signs and nursing note reviewed. Constitutional:       General: He is not in acute distress. Appearance: He is well-developed. He is not diaphoretic. Eyes:      Extraocular Movements: Extraocular movements intact. Conjunctiva/sclera:      Right eye: Right conjunctiva is not injected. Left eye: Left conjunctiva is not injected. Pupils: Pupils are equal, round, and reactive to light. Pupils are equal.   Neck:      Musculoskeletal: Normal range of motion. Cardiovascular:      Rate and Rhythm: Normal rate and regular rhythm. Heart sounds: No murmur. Pulmonary:      Effort: Pulmonary effort is normal. No respiratory distress. Breath sounds: Normal breath sounds. Musculoskeletal:      Right knee: He exhibits normal range of motion. Left knee: He exhibits normal range of motion. Skin:     General: Skin is warm. Findings: No rash. Neurological:      Mental Status: He is alert and oriented to person, place, and time. Psychiatric:         Behavior: Behavior normal.         DIAGNOSTIC RESULTS     Labs:No results found for this visit on 05/03/21.     IMAGING:    No orders to display         EKG:  none    URGENT CARE COURSE:     Vitals:    05/03/21 1300   BP: (!) 148/77   Pulse: 55   Resp: 18   Temp: 98.1 °F (36.7 °C)   TempSrc: Oral   SpO2: 96%   Weight: 220 lb (99.8 kg)   Height: 5' 8\" (1.727 m) Medications   ondansetron (ZOFRAN-ODT) disintegrating tablet 4 mg (has no administration in time range)   ketorolac (TORADOL) injection 30 mg (has no administration in time range)            PROCEDURES:  None    FINAL IMPRESSION      1. Acute nonintractable headache, unspecified headache type    2. Elevated blood pressure reading          DISPOSITION/ PLAN       I discussed with the patient the plan to treat with Zofran and Toradol for his headache and nausea vomiting. Discussed that his blood pressure reading is elevated today in the urgent care and this could likely be the cause of his headaches. He is instructed that he must follow-up with his PCP for a blood pressure check and further intervention as soon as possible. He is agreeable to plan as discussed. He is instructed to present to the ER if his symptoms worsen. He is given a work note at his request.    PATIENT REFERRED TO:  Lauryn Vance.  CHRISTOPHER Vanegas CNP  [address] [address] / [facility] [address]      DISCHARGE MEDICATIONS:  New Prescriptions    ONDANSETRON (ZOFRAN ODT) 4 MG DISINTEGRATING TABLET    Take 1 tablet by mouth every 8 hours as needed for Nausea or Vomiting       Discontinued Medications    No medications on file       Current Discharge Medication List          CHRISTOPHER Brooks CNP    (Please note that portions of this note were completed with a voice recognition program. Efforts were made to edit the dictations but occasionally words are mis-transcribed.)          CHRISTOPHER Brooks CNP  05/03/21 9368

## 2021-05-07 ENCOUNTER — APPOINTMENT (OUTPATIENT)
Dept: CT IMAGING | Age: 21
End: 2021-05-07
Payer: COMMERCIAL

## 2021-05-07 ENCOUNTER — HOSPITAL ENCOUNTER (EMERGENCY)
Age: 21
Discharge: HOME | End: 2021-05-08
Attending: EMERGENCY MEDICINE
Payer: COMMERCIAL

## 2021-05-07 DIAGNOSIS — G44.209 ACUTE NON INTRACTABLE TENSION-TYPE HEADACHE: Primary | ICD-10-CM

## 2021-05-07 PROCEDURE — 70450 CT HEAD/BRAIN W/O DYE: CPT

## 2021-05-07 PROCEDURE — 99284 EMERGENCY DEPT VISIT MOD MDM: CPT

## 2021-05-07 RX ORDER — PROCHLORPERAZINE EDISYLATE 5 MG/ML
10 INJECTION INTRAMUSCULAR; INTRAVENOUS ONCE
Status: COMPLETED | OUTPATIENT
Start: 2021-05-08 | End: 2021-05-08

## 2021-05-07 RX ORDER — ACETAMINOPHEN 325 MG/1
650 TABLET ORAL ONCE
Status: COMPLETED | OUTPATIENT
Start: 2021-05-08 | End: 2021-05-08

## 2021-05-07 RX ORDER — 0.9 % SODIUM CHLORIDE 0.9 %
500 INTRAVENOUS SOLUTION INTRAVENOUS ONCE
Status: COMPLETED | OUTPATIENT
Start: 2021-05-08 | End: 2021-05-08

## 2021-05-07 RX ORDER — KETOROLAC TROMETHAMINE 30 MG/ML
30 INJECTION, SOLUTION INTRAMUSCULAR; INTRAVENOUS ONCE
Status: COMPLETED | OUTPATIENT
Start: 2021-05-08 | End: 2021-05-08

## 2021-05-08 VITALS
RESPIRATION RATE: 18 BRPM | WEIGHT: 220 LBS | BODY MASS INDEX: 33.45 KG/M2 | HEART RATE: 80 BPM | TEMPERATURE: 97.9 F | OXYGEN SATURATION: 96 % | SYSTOLIC BLOOD PRESSURE: 137 MMHG | DIASTOLIC BLOOD PRESSURE: 69 MMHG

## 2021-05-08 PROCEDURE — 2580000003 HC RX 258: Performed by: STUDENT IN AN ORGANIZED HEALTH CARE EDUCATION/TRAINING PROGRAM

## 2021-05-08 PROCEDURE — 6360000002 HC RX W HCPCS: Performed by: STUDENT IN AN ORGANIZED HEALTH CARE EDUCATION/TRAINING PROGRAM

## 2021-05-08 PROCEDURE — 6370000000 HC RX 637 (ALT 250 FOR IP): Performed by: STUDENT IN AN ORGANIZED HEALTH CARE EDUCATION/TRAINING PROGRAM

## 2021-05-08 RX ADMIN — ACETAMINOPHEN 650 MG: 325 TABLET ORAL at 00:25

## 2021-05-08 RX ADMIN — SODIUM CHLORIDE 500 ML: 9 INJECTION, SOLUTION INTRAVENOUS at 00:31

## 2021-05-08 RX ADMIN — KETOROLAC TROMETHAMINE 30 MG: 30 INJECTION, SOLUTION INTRAMUSCULAR; INTRAVENOUS at 00:23

## 2021-05-08 RX ADMIN — PROCHLORPERAZINE EDISYLATE 10 MG: 5 INJECTION INTRAMUSCULAR; INTRAVENOUS at 00:23

## 2021-05-08 ASSESSMENT — ENCOUNTER SYMPTOMS
ABDOMINAL DISTENTION: 0
CHEST TIGHTNESS: 0
SORE THROAT: 0
PHOTOPHOBIA: 0
VOMITING: 1
NAUSEA: 1
SHORTNESS OF BREATH: 0
COLOR CHANGE: 0

## 2021-05-08 ASSESSMENT — PAIN DESCRIPTION - PAIN TYPE: TYPE: ACUTE PAIN

## 2021-05-08 NOTE — ED PROVIDER NOTES
[facility] ENCOUNTER        Pt Name: Norma June  MRN: [id number]  [place] 2000  Date of evaluation: 5/7/2021  Treating Resident Physician: Akshat Puga DO  Supervising Physician: [id number]       Chief Complaint   Patient presents with    Headache    Emesis     History obtained from the patient. HISTORY OF PRESENT ILLNESS    HPI  Norma June is a 23 y.o. male who presents to the emergency department for evaluation of 2 weeks of persistent headaches that start the occiput and radiate to the front of the head. Worse during awakening and improves throughout the day. 3 episodes of nonbloody nonbilious vomitus today with persistent nausea. Patient denies any numbness, tingling, difficulty urinating. No changes in vision, change in hearing. Able to tolerate oral intake without difficulty. Denies fevers, chills, IV drug use. The patient has no other acute complaints at this time. REVIEW OF SYSTEMS   Review of Systems   Constitutional: Negative for chills and fatigue. HENT: Negative for sneezing and sore throat. Eyes: Negative for photophobia and visual disturbance. Respiratory: Negative for chest tightness and shortness of breath. Cardiovascular: Negative for chest pain and palpitations. Gastrointestinal: Positive for nausea and vomiting. Negative for abdominal distention. Genitourinary: Negative for difficulty urinating and dysuria. Musculoskeletal: Negative for neck pain and neck stiffness. Skin: Negative for color change and rash. Neurological: Positive for headaches. Negative for seizures, syncope, weakness and numbness. PAST MEDICAL AND SURGICAL HISTORY     Past Medical History:   Diagnosis Date    Asthma      No past surgical history on file.       MEDICATIONS     Current Facility-Administered Medications:     0.9 % sodium chloride bolus, 500 mL, Intravenous, Once, Adryan C Jazmine Quinteros DO, Last Rate: 250 mL/hr at 05/08/21 0031, 500 mL at 05/08/21 0031    Current Outpatient Medications:     acetaminophen (TYLENOL) 325 MG tablet, Take 650 mg by mouth every 6 hours as needed for Pain, Disp: , Rfl:     ibuprofen (ADVIL;MOTRIN) 200 MG tablet, Take 200 mg by mouth every 6 hours as needed for Pain, Disp: , Rfl:     ondansetron (ZOFRAN ODT) 4 MG disintegrating tablet, Take 1 tablet by mouth every 8 hours as needed for Nausea or Vomiting, Disp: 20 tablet, Rfl: 0      SOCIAL HISTORY     Social History     Social History Narrative    Not on file     Social History     Tobacco Use    Smoking status: Never Smoker    Smokeless tobacco: Never Used   Substance Use Topics    Alcohol use: Yes     Comment: occ    Drug use: Yes     Types: Marijuana         ALLERGIES     Allergies   Allergen Reactions    Other      \"seafood in general\"         FAMILY HISTORY     Family History   Problem Relation Age of Onset    Diabetes Father     High Blood Pressure Father     Diabetes Sister     No Known Problems Mother          PREVIOUS RECORDS   Previous records reviewed: Patient last in this emergency department 2018 for laceration. [facility] PHYSICAL EXAM     ED Triage Vitals [05/07/21 2324]   BP Temp Temp Source Pulse Resp SpO2 Height Weight   -- 97.9 °F (36.6 °C) Oral 59 16 99 % -- 220 lb (99.8 kg)     Initial vital signs and nursing assessment reviewed and normal. Body mass index is 33.45 kg/m². Pulsoximetry is normal per my interpretation. Additional Vital Signs:  Vitals:    05/08/21 0038   BP: 137/69   Pulse: 80   Resp: 18   Temp:    SpO2: 96%       Physical Exam  Constitutional:       General: He is not in acute distress. Appearance: Normal appearance. He is not ill-appearing or diaphoretic. HENT:      Head: Normocephalic and atraumatic.       Right Ear: Tympanic membrane, ear canal and external ear normal.      Left Ear: Tympanic membrane, ear canal and external ear normal.      Nose: Nose normal. No congestion or rhinorrhea. Mouth/Throat:      Mouth: Mucous membranes are moist.      Pharynx: Oropharynx is clear. No oropharyngeal exudate or posterior oropharyngeal erythema. Eyes:      Extraocular Movements: Extraocular movements intact. Conjunctiva/sclera: Conjunctivae normal.      Pupils: Pupils are equal, round, and reactive to light. Neck:      Musculoskeletal: Normal range of motion. No neck rigidity or muscular tenderness. Cardiovascular:      Rate and Rhythm: Normal rate and regular rhythm. Pulses: Normal pulses. Heart sounds: Normal heart sounds. No murmur. No friction rub. No gallop. Pulmonary:      Effort: Pulmonary effort is normal.      Breath sounds: Normal breath sounds. No wheezing, rhonchi or rales. Musculoskeletal: Normal range of motion. General: No swelling. Right lower leg: No edema. Left lower leg: No edema. Skin:     General: Skin is warm and dry. Capillary Refill: Capillary refill takes less than 2 seconds. Findings: No bruising, erythema or lesion. Neurological:      General: No focal deficit present. Mental Status: He is alert and oriented to person, place, and time. Cranial Nerves: No cranial nerve deficit. Sensory: No sensory deficit. Motor: No weakness. MEDICAL DECISION MAKING   Initial Assessment:   1. Very pleasant 22-year-old male with 2 weeks persistent headache. Worse in the morning, waxes and wanes. No focal neurologic deficit, he is neurovascularly intact. No IV drug use or other red flag symptoms such as fever. Plan:    Noncontrast CT secondary to the concerning early morning headache and persistence.  Tylenol, Toradol, Compazine, fluid.  Reassessment   Expected disposition is discharge home with outpatient follow-up for improving symptoms.       ED RESULTS   Laboratory results:  Labs Reviewed - No data to display    Radiologic studies results:  CT HEAD WO CONTRAST Final Result   Impression:   No acute intracranial abnormality. Mild sinus disease. This document has been electronically signed by: Sunil Fay MD on    05/08/2021 12:56 AM      All CTs at this facility use dose modulation techniques and iterative    reconstructions, and/or weight-based dosing   when appropriate to reduce radiation to a low as reasonably achievable. ED Medications administered this visit:   Medications   0.9 % sodium chloride bolus (500 mLs Intravenous New Bag 5/8/21 0031)   ketorolac (TORADOL) injection 30 mg (30 mg Intravenous Given 5/8/21 0023)   acetaminophen (TYLENOL) tablet 650 mg (650 mg Oral Given 5/8/21 0025)   prochlorperazine (COMPAZINE) injection 10 mg (10 mg Intravenous Given 5/8/21 0023)         ED COURSE     ED Course as of May 08 0116   Sat May 08, 2021   0116 Patient sleeping comfortably during my reevaluation. I gently awoke him and he states he is feeling much improved with much less pain in his head. He is agreeable to follow-up with his PCP and be discharged home.    [EM]      ED Course User Index  [EM] Jamel Van DO         Strict return precautions and follow up instructions were discussed with the patient prior to discharge, with which the patient agrees. FINAL DISPOSITION     Final diagnoses:   Acute non intractable tension-type headache     Condition: condition: stable  Dispo: Discharge to home      This transcription was electronically signed. Parts of this transcriptions may have been dictated by use of voice recognition software and electronically transcribed, and parts may have been transcribed with the assistance of an ED scribe. The transcription may contain errors not detected in proofreading. Please refer to my supervising physician's documentation if my documentation differs.     Electronically Signed: Jamel Van, 05/08/21, 1:16 AM       Jamel Van DO  Resident  05/08/21 8775

## 2021-05-08 NOTE — ED NOTES
Patient ambulated from intake without any difficulties. Pt states headaches is 5/10 after medication. Pt lights dimmed and door closed for comfort.       Susan Florez RN  05/08/21 1593

## 2021-05-08 NOTE — ED PROVIDER NOTES
ATTENDING ATTESTATION  Evaluation of Ameena Reich. I personally saw and examined the patient. I have reviewed and agree with the Resident's findings, including all diagnostic interpretations and treatment plans as written. I was present for the key portion of any procedures performed and the inclusive time noted in any critical care statement. I have reviewed the nursing documentation, medical/surgical/family/social hx, medications, allergies, and vital signs. Brief HPI: 25-year-old male presents for evaluation of headache over the last 2 weeks. Patient states that his headache began while he was having sexual intercourse. He did not have any episodes of fainting. He reports that now the pain worsens when he is exerting himself. He denies weakness of the arms or legs, vision changes, slurred speech, difficulty with gait. He does report emesis, 3 episodes today. Patient has no history of migraines. Patient has not had neuroimaging since he was a child. Physical Exam: Well-appearing male. No acute distress. Heart is regular without murmur. Lungs are clear bilaterally. Strength is equal in all 4 extremities. Coordination is intact. No dysmetria. Extraocular movements intact. Pupils are equal round and reactive. MDM: 25-year-old male presents for headache x2 weeks. Patient has had vomiting associated with this. Headache is exertional as well. Patient has no focal neurologic deficit. Given that he has not had neuroimaging and does not get headaches, patient will have a CT brain without contrast to evaluate for mass or other pathology. CT brain negative. Pt had improvement after migraine cocktail. Pt agreeable to discharge home and PCP follow up. Critical Care Time: None     The case was discussed with the resident and a care plan was developed. I agree with the note and plan as documented by the resident, except if my documentation differs.    Please see the residents' completed note for final disposition except as documented on this attestation. Diagnosis, treatment, and disposition plans were discussed and agreed upon by patient. This transcription was electronically signed. It was dictated by use of voice recognition software and electronically transcribed. The transcription may contain errors not detected in proofreading.        Electronically signed by Rivas MD on 5/8/21 at 1:07 AM CASSIE Medina MD  05/11/21 [id number]

## 2021-10-22 NOTE — ED TRIAGE NOTES
To room with c/o headache and vomiting for one week. Vomiting last yesterday at 10 am (3-4 episodes in the last 24 hrs). He reports that he has never had a headache last this long. Denies having CT before. Denies head injury. <-------- Click here to INCLUDE [facility]19 Discharge Instructions